# Patient Record
Sex: MALE | Race: BLACK OR AFRICAN AMERICAN | Employment: FULL TIME | URBAN - METROPOLITAN AREA
[De-identification: names, ages, dates, MRNs, and addresses within clinical notes are randomized per-mention and may not be internally consistent; named-entity substitution may affect disease eponyms.]

---

## 2019-07-03 ENCOUNTER — HOSPITAL ENCOUNTER (EMERGENCY)
Age: 34
Discharge: HOME | End: 2019-07-03
Attending: EMERGENCY MEDICINE | Admitting: EMERGENCY MEDICINE
Payer: SELF-PAY

## 2019-07-03 ENCOUNTER — APPOINTMENT (OUTPATIENT)
Dept: GENERAL RADIOLOGY | Age: 34
End: 2019-07-03
Attending: EMERGENCY MEDICINE
Payer: SELF-PAY

## 2019-07-03 VITALS
BODY MASS INDEX: 32.58 KG/M2 | WEIGHT: 220 LBS | HEIGHT: 69 IN | TEMPERATURE: 97.9 F | SYSTOLIC BLOOD PRESSURE: 122 MMHG | HEART RATE: 81 BPM | DIASTOLIC BLOOD PRESSURE: 87 MMHG | RESPIRATION RATE: 18 BRPM | OXYGEN SATURATION: 99 %

## 2019-07-03 DIAGNOSIS — S22.42XA CLOSED FRACTURE OF MULTIPLE RIBS OF LEFT SIDE, INITIAL ENCOUNTER: Primary | ICD-10-CM

## 2019-07-03 PROCEDURE — 77030027138 HC INCENT SPIROMETER -A

## 2019-07-03 PROCEDURE — 74011250637 HC RX REV CODE- 250/637: Performed by: EMERGENCY MEDICINE

## 2019-07-03 PROCEDURE — 71101 X-RAY EXAM UNILAT RIBS/CHEST: CPT

## 2019-07-03 PROCEDURE — 99283 EMERGENCY DEPT VISIT LOW MDM: CPT

## 2019-07-03 RX ORDER — IBUPROFEN 600 MG/1
600 TABLET ORAL
Status: COMPLETED | OUTPATIENT
Start: 2019-07-03 | End: 2019-07-03

## 2019-07-03 RX ADMIN — IBUPROFEN 600 MG: 600 TABLET ORAL at 08:41

## 2019-07-03 NOTE — DISCHARGE INSTRUCTIONS
Patient Education     You were seen and evaluated in the Emergency Department. Please understand that your work up is not all encompassing and you should follow up with your primary care physician for further management and continuity of care. Please return to Emergency Department or seek medical attention immediately if you have acute worsening in your symptoms or develop chest pain, shortness of breath, repeated vomiting, fever, altered level of consciousness, coughing up blood, or start sweating and feel clammy. If you were prescribed any medicine for home, please take as prescribed by your health-care provider. If you were given any follow-up appointments or numbers to call, please do so as instructed. Avoid any tobacco products or excessive alcohol. Broken Rib: Care Instructions  Your Care Instructions    A broken rib is a crack or break in one of the bones of the rib cage. Breathing can be very painful because the muscles used for breathing pull on the rib. In most cases, a broken rib will heal on its own. You can take pain medicine while the rib mends. Pain relief allows you to take deep breaths. In the past, doctors recommended taping or wrapping broken ribs. This is no longer done because taping makes it hard for you to take deep breaths. Taking deep breaths may help prevent pneumonia or a partial collapse of a lung. Your rib will heal in about 6 weeks. You heal best when you take good care of yourself. Eat a variety of healthy foods, and don't smoke. Follow-up care is a key part of your treatment and safety. Be sure to make and go to all appointments, and call your doctor if you are having problems. It's also a good idea to know your test results and keep a list of the medicines you take. How can you care for yourself at home? · Be safe with medicines. Read and follow all instructions on the label. ? If the doctor gave you a prescription medicine for pain, take it as prescribed. ?  If you are not taking a prescription pain medicine, ask your doctor if you can take an over-the-counter medicine. · Even if it hurts, try to cough or take the deepest breath you can at least once every hour. This will get air deeply into your lungs. This may reduce your chance of getting pneumonia or a partial collapse of a lung. Hold a pillow against your chest to make this less painful. · Put ice or a cold pack on the area for 10 to 20 minutes at a time. Put a thin cloth between the ice and your skin. When should you call for help? Call 911 anytime you think you may need emergency care. For example, call if:    · You have severe trouble breathing.    Call your doctor now or seek immediate medical care if:    · You have some trouble breathing.     · You have a fever.     · You have a new or worse cough.    Watch closely for changes in your health, and be sure to contact your doctor if:    · You have pain even after taking your medicine.     · You do not get better as expected. Where can you learn more? Go to http://kassie-jer.info/. Enter M135 in the search box to learn more about \"Broken Rib: Care Instructions. \"  Current as of: September 20, 2018  Content Version: 11.9  © 8146-3827 [employer]. Care instructions adapted under license by [employer] (which disclaims liability or warranty for this information). If you have questions about a medical condition or this instruction, always ask your healthcare professional. [place] [address] any warranty or liability for your use of this information.

## 2019-07-03 NOTE — ED TRIAGE NOTES
Patient reports left sided chest pain and neck pain after a fall on Sunday, patient states he was wrestling with friends and fell on chest. A/ox4, moves all extremities, patient reports pain with deep breaths

## 2019-07-03 NOTE — ED NOTES
Provided patient teaching on incentive spirometer to patient and spouse. Pt denies having any additional questions or concerns.

## 2019-07-03 NOTE — ED PROVIDER NOTES
EMERGENCY DEPARTMENT HISTORY AND PHYSICAL EXAM    Date: 7/3/2019  Patient Name: Bimal Johansen    History of Presenting Illness     Chief Complaint   Patient presents with   Bonilla Fall    Chest Pain         History Provided By: Patient    Additional History (Context):   Bimal Johansen is a 35 y.o. male with no significant past medical history presents to the emergency department C/O left-sided lower rib pain after falling directly onto his chest yesterday. Pain worsened with taking deep breaths. Pt denies shortness of breath, abdominal pain and any other sxs or complaints. Also reports that he woke up with pain to the left side of his neck. Denies any trauma, direct injury to his neck. Denies any weakness, numbness. PCP: None        Past History     Past Medical History:  History reviewed. No pertinent past medical history. Past Surgical History:  Past Surgical History:   Procedure Laterality Date    HX WISDOM TEETH EXTRACTION         Family History:  History reviewed. No pertinent family history. Social History:  Social History     Tobacco Use    Smoking status: Never Smoker    Smokeless tobacco: Never Used   Substance Use Topics    Alcohol use: Never     Frequency: Never    Drug use: Never       Allergies:  No Known Allergies      Review of Systems   Review of Systems   Constitutional: Negative for chills and fever. HENT: Negative for congestion, ear pain, sinus pain and sore throat. Eyes: Negative for pain and visual disturbance. Respiratory: Negative for cough and shortness of breath. Cardiovascular: Positive for chest pain. Negative for leg swelling. Gastrointestinal: Negative for abdominal pain, constipation, diarrhea, nausea and vomiting. Genitourinary: Negative for dysuria and hematuria. Musculoskeletal: Negative for back pain and neck pain. Skin: Negative for pallor and rash. Neurological: Negative for dizziness, tremors, weakness, light-headedness and headaches.    All other systems reviewed and are negative. Physical Exam     Vitals:    07/03/19 0820   BP: 122/87   Pulse: 81   Resp: 18   Temp: 97.9 °F (36.6 °C)   SpO2: 99%   Weight: 99.8 kg (220 lb)   Height: 5' 9\" (1.753 m)     Physical Exam    Nursing note and vitals reviewed    Constitutional: Well appearing young -American male, no acute distress  Head: Normocephalic, Atraumatic  Eyes: Pupils are equal, round, and reactive to light, EOMI  Neck: Supple, non-tender, no midline tenderness, no step-offs or misalignment  Cardiovascular: Regular rate and rhythm, no murmurs, rubs, or gallops  Chest: Mild tenderness of the left lower ribs with no obvious crepitus or deformity, no ecchymoses, no erythema, normal work of breathing and chest excursion bilaterally  Lungs: Clear to ausculation bilaterally, no wheezes, no rhonchi  Abdomen: Soft, non tender, non distended, normoactive bowel sounds  Back: No evidence of trauma or deformity  Extremities: No evidence of trauma or deformity, no LE edema  Skin: Warm and dry, normal cap refill  Neuro: Alert and appropriate, CN intact, normal speech, moving all 4 extremities freely and symmetrically  Psychiatric: Normal mood and affect       Diagnostic Study Results     Labs -   No results found for this or any previous visit (from the past 12 hour(s)). Radiologic Studies -   XR RIBS LT W PA CXR MIN 3 V   Final Result   IMPRESSION:         1. Findings suspicious for nondisplaced fractures of the lateral left sixth and   seventh ribs associated with tiny effusion. No pneumothorax. 2. Lungs are clear. CT Results  (Last 48 hours)    None        CXR Results  (Last 48 hours)    None            Medical Decision Making   I am the first provider for this patient. I reviewed the vital signs, available nursing notes, past medical history, past surgical history, family history and social history. Vital Signs-Reviewed the patient's vital signs.     Pulse Oximetry Analysis - 99% on room air    Records Reviewed: Nursing Notes and Old Medical Records    Provider Notes:   35 y.o. male presenting with left-sided lower rib pain after falling yesterday. On exam patient is saturating 99% on room air. Afebrile, normotensive and not tachycardic. Mild tenderness over the left lower ribs with no obvious external signs of injury. No crepitus or deformity. No ecchymosis or erythema. Will obtain left rib with chest x-ray. Will provide Motrin for symptom control. No indication for imaging of his neck as patient denies any injury to his neck, likely muscular strain. Procedures:  Procedures    ED Course:   8:23 AM   Initial assessment performed. The patients presenting problems have been discussed, and they are in agreement with the care plan formulated and outlined with them. I have encouraged them to ask questions as they arise throughout their visit. 9:13 AM  X-rays findings suspicious for nondisplaced fractures of the lateral sixth and seventh ribs. No pneumothorax. On reassessment, patient continues to be well-appearing. Sitting on the stretcher playing with his iPhone. Will discharge with incentive spirometer. Encourage Motrin and Tylenol for symptom control. Diagnosis and Disposition       DISCHARGE NOTE:  9:13 AM    Leo Gold's  results have been reviewed with him. He has been counseled regarding his diagnosis, treatment, and plan. He verbally conveys understanding and agreement of the signs, symptoms, diagnosis, treatment and prognosis and additionally agrees to follow up as discussed. He also agrees with the care-plan and conveys that all of his questions have been answered. I have also provided discharge instructions for him that include: educational information regarding their diagnosis and treatment, and list of reasons why they would want to return to the ED prior to their follow-up appointment, should his condition change.  He has been provided with education for proper emergency department utilization. CLINICAL IMPRESSION:    1. Closed fracture of multiple ribs of left side, initial encounter        PLAN:  1. D/C Home  2. There are no discharge medications for this patient. 3.   Follow-up Information     Follow up With Specialties Details Why Contact Info    [address] [address] [place] [address]  Schedule an appointment as soon as possible for a visit in 2 days  [id number] [facility], [address] [facility] [address]    [facility] EMERGENCY DEPT Emergency Medicine  As needed if symptoms worsen [address] [address]  [phone]        ____________________________________     Please note that this dictation was completed with [employer], the computer voice recognition software. Quite often unanticipated grammatical, syntax, homophones, and other interpretive errors are inadvertently transcribed by the computer software. Please disregard these errors. Please excuse any errors that have escaped final proofreading.